# Patient Record
Sex: MALE | Race: WHITE | Employment: FULL TIME | URBAN - METROPOLITAN AREA
[De-identification: names, ages, dates, MRNs, and addresses within clinical notes are randomized per-mention and may not be internally consistent; named-entity substitution may affect disease eponyms.]

---

## 2023-06-06 ENCOUNTER — OFFICE VISIT (OUTPATIENT)
Age: 28
End: 2023-06-06
Payer: COMMERCIAL

## 2023-06-06 VITALS
RESPIRATION RATE: 20 BRPM | HEIGHT: 74 IN | TEMPERATURE: 97.8 F | OXYGEN SATURATION: 97 % | SYSTOLIC BLOOD PRESSURE: 111 MMHG | WEIGHT: 212.8 LBS | DIASTOLIC BLOOD PRESSURE: 80 MMHG | BODY MASS INDEX: 27.31 KG/M2 | HEART RATE: 62 BPM

## 2023-06-06 DIAGNOSIS — Z00.00 ROUTINE GENERAL MEDICAL EXAMINATION AT A HEALTH CARE FACILITY: Primary | ICD-10-CM

## 2023-06-06 DIAGNOSIS — R56.9 SEIZURE (HCC): ICD-10-CM

## 2023-06-06 DIAGNOSIS — Z11.4 ENCOUNTER FOR SCREENING FOR HIV: ICD-10-CM

## 2023-06-06 DIAGNOSIS — Z11.59 ENCOUNTER FOR HEPATITIS C SCREENING TEST FOR LOW RISK PATIENT: ICD-10-CM

## 2023-06-06 DIAGNOSIS — B35.4 TINEA CORPORIS: ICD-10-CM

## 2023-06-06 DIAGNOSIS — Z23 NEED FOR TETANUS, DIPHTHERIA, AND ACELLULAR PERTUSSIS (TDAP) VACCINE: ICD-10-CM

## 2023-06-06 PROCEDURE — 99395 PREV VISIT EST AGE 18-39: CPT | Performed by: STUDENT IN AN ORGANIZED HEALTH CARE EDUCATION/TRAINING PROGRAM

## 2023-06-06 PROCEDURE — 90715 TDAP VACCINE 7 YRS/> IM: CPT | Performed by: STUDENT IN AN ORGANIZED HEALTH CARE EDUCATION/TRAINING PROGRAM

## 2023-06-06 PROCEDURE — 90471 IMMUNIZATION ADMIN: CPT | Performed by: STUDENT IN AN ORGANIZED HEALTH CARE EDUCATION/TRAINING PROGRAM

## 2023-06-06 SDOH — ECONOMIC STABILITY: HOUSING INSECURITY
IN THE LAST 12 MONTHS, WAS THERE A TIME WHEN YOU DID NOT HAVE A STEADY PLACE TO SLEEP OR SLEPT IN A SHELTER (INCLUDING NOW)?: NO

## 2023-06-06 SDOH — ECONOMIC STABILITY: INCOME INSECURITY: HOW HARD IS IT FOR YOU TO PAY FOR THE VERY BASICS LIKE FOOD, HOUSING, MEDICAL CARE, AND HEATING?: NOT HARD AT ALL

## 2023-06-06 SDOH — ECONOMIC STABILITY: FOOD INSECURITY: WITHIN THE PAST 12 MONTHS, THE FOOD YOU BOUGHT JUST DIDN'T LAST AND YOU DIDN'T HAVE MONEY TO GET MORE.: NEVER TRUE

## 2023-06-06 SDOH — ECONOMIC STABILITY: FOOD INSECURITY: WITHIN THE PAST 12 MONTHS, YOU WORRIED THAT YOUR FOOD WOULD RUN OUT BEFORE YOU GOT MONEY TO BUY MORE.: NEVER TRUE

## 2023-06-06 ASSESSMENT — PATIENT HEALTH QUESTIONNAIRE - PHQ9
SUM OF ALL RESPONSES TO PHQ9 QUESTIONS 1 & 2: 0
1. LITTLE INTEREST OR PLEASURE IN DOING THINGS: 0
2. FEELING DOWN, DEPRESSED OR HOPELESS: 0
SUM OF ALL RESPONSES TO PHQ QUESTIONS 1-9: 0

## 2023-06-06 NOTE — ASSESSMENT & PLAN NOTE
He is experiencing aura that is concerning for seizure based on his past episodes. Will refer to neurology to re-establish.

## 2023-06-06 NOTE — PROGRESS NOTES
Coleen Stein is a 28y.o. year old male who is a new patient to me today (06/06/23). He was previous followed by Dr Rosanna Hart in [place], last seen 1-2 years ago. Assessment & Plan:   1. Routine general medical examination at a health care facility  Reviewed diet and exercise habits - he does very well with this. Updated health maintenance, see below. Check screening labs. -     CBC with Auto Differential; Future  -     Comprehensive Metabolic Panel; Future  -     Lipid Panel; Future  2. Seizure [facility])  Assessment & Plan:  He is experiencing aura that is concerning for seizure based on his past episodes. Will refer to neurology to re-establish. Orders:  -     [place]  3. Tinea corporis  - start lotrimin BID x2 weeks to affected area  4. Need for tetanus, diphtheria, and acellular pertussis (Tdap) vaccine  -     Tdap, BOOSTRIX, (age 8 yrs+), IM  5. Encounter for hepatitis C screening test for low risk patient  -     Hepatitis C Antibody; Future  6. Encounter for screening for HIV  -     HIV 1/2 Ag/Ab, 4TH Generation,W Rflx Confirm; Future      Health Maintenance   Flu vaccine:  COVID vaccine: 10/2022 bivalent   Tetanus vaccine: will get today   Shingles vaccine:  Pneumonia vaccine:   Colon cancer screening:   PSA:  AAA screening:  Lung cancer screening:  Hep C: check today  HIV: Check today   Lipid: check today   DM: NA  Healthcare decision maker: wife  ACP:       RTC: 1 year annual     Subjective:   Pedro Luis was seen today for [facility]    Seizure in 2015. Was seen by neurologist. Was on AED for a couple years but had side effects and had tests and was able to come off medication. He is now having episodes he describes as auras with a strange smell that is familiar to him but he is unable to tell exactly what it is, dizziness, nausea. This happened about 1 mo ago and the maybe a year ago before this.  He would like to see a

## 2023-06-06 NOTE — PATIENT INSTRUCTIONS
Neurology   - Dr Moody De Dios location  - Dr Aminah Vasquez location  - Neurological [facility]     Start lotrimin (active ingredient clotrimazole) twice daily for 2 week for fungal rash on chest and face.

## 2023-06-07 LAB
ALBUMIN SERPL-MCNC: 4.7 G/DL (ref 4.1–5.2)
ALBUMIN/GLOB SERPL: 2 {RATIO} (ref 1.2–2.2)
ALP SERPL-CCNC: 66 IU/L (ref 44–121)
ALT SERPL-CCNC: 30 IU/L (ref 0–44)
AST SERPL-CCNC: 22 IU/L (ref 0–40)
BASOPHILS # BLD AUTO: 0 X10E3/UL (ref 0–0.2)
BASOPHILS NFR BLD AUTO: 1 %
BILIRUB SERPL-MCNC: 0.2 MG/DL (ref 0–1.2)
BUN SERPL-MCNC: 20 MG/DL (ref 6–20)
BUN/CREAT SERPL: 18 (ref 9–20)
CALCIUM SERPL-MCNC: 9.7 MG/DL (ref 8.7–10.2)
CHLORIDE SERPL-SCNC: 102 MMOL/L (ref 96–106)
CHOLEST SERPL-MCNC: 211 MG/DL (ref 100–199)
CO2 SERPL-SCNC: 23 MMOL/L (ref 20–29)
CREAT SERPL-MCNC: 1.13 MG/DL (ref 0.76–1.27)
EGFRCR SERPLBLD CKD-EPI 2021: 91 ML/MIN/1.73
EOSINOPHIL # BLD AUTO: 0.1 X10E3/UL (ref 0–0.4)
EOSINOPHIL NFR BLD AUTO: 3 %
ERYTHROCYTE [DISTWIDTH] IN BLOOD BY AUTOMATED COUNT: 12.3 % (ref 11.6–15.4)
GLOBULIN SER CALC-MCNC: 2.3 G/DL (ref 1.5–4.5)
GLUCOSE SERPL-MCNC: 104 MG/DL (ref 70–99)
HCT VFR BLD AUTO: 43.4 % (ref 37.5–51)
HCV IGG SERPL QL IA: NON REACTIVE
HDLC SERPL-MCNC: 48 MG/DL
HGB BLD-MCNC: 14.9 G/DL (ref 13–17.7)
HIV 1+2 AB+HIV1 P24 AG SERPL QL IA: NON REACTIVE
IMM GRANULOCYTES # BLD AUTO: 0 X10E3/UL (ref 0–0.1)
IMM GRANULOCYTES NFR BLD AUTO: 0 %
LDLC SERPL CALC-MCNC: 138 MG/DL (ref 0–99)
LYMPHOCYTES # BLD AUTO: 1.4 X10E3/UL (ref 0.7–3.1)
LYMPHOCYTES NFR BLD AUTO: 37 %
MCH RBC QN AUTO: 28.1 PG (ref 26.6–33)
MCHC RBC AUTO-ENTMCNC: 34.3 G/DL (ref 31.5–35.7)
MCV RBC AUTO: 82 FL (ref 79–97)
MONOCYTES # BLD AUTO: 0.4 X10E3/UL (ref 0.1–0.9)
MONOCYTES NFR BLD AUTO: 11 %
NEUTROPHILS # BLD AUTO: 1.9 X10E3/UL (ref 1.4–7)
NEUTROPHILS NFR BLD AUTO: 48 %
PLATELET # BLD AUTO: 206 X10E3/UL (ref 150–450)
POTASSIUM SERPL-SCNC: 4.3 MMOL/L (ref 3.5–5.2)
PROT SERPL-MCNC: 7 G/DL (ref 6–8.5)
RBC # BLD AUTO: 5.3 X10E6/UL (ref 4.14–5.8)
SODIUM SERPL-SCNC: 140 MMOL/L (ref 134–144)
TRIGL SERPL-MCNC: 138 MG/DL (ref 0–149)
VLDLC SERPL CALC-MCNC: 25 MG/DL (ref 5–40)
WBC # BLD AUTO: 3.9 X10E3/UL (ref 3.4–10.8)

## 2024-01-15 ENCOUNTER — OFFICE VISIT (OUTPATIENT)
Age: 29
End: 2024-01-15
Payer: COMMERCIAL

## 2024-01-15 VITALS
WEIGHT: 214 LBS | HEART RATE: 74 BPM | HEIGHT: 74 IN | OXYGEN SATURATION: 98 % | DIASTOLIC BLOOD PRESSURE: 62 MMHG | BODY MASS INDEX: 27.46 KG/M2 | RESPIRATION RATE: 16 BRPM | SYSTOLIC BLOOD PRESSURE: 118 MMHG

## 2024-01-15 DIAGNOSIS — G40.019 LOCALZ-RELATED IDIOP EPILEP W SZ OF LOCALZ ONSET, INTRACT, WO STATUS (HCC): Primary | ICD-10-CM

## 2024-01-15 PROCEDURE — 99204 OFFICE O/P NEW MOD 45 MIN: CPT | Performed by: PSYCHIATRY & NEUROLOGY

## 2024-01-15 RX ORDER — LEVETIRACETAM 500 MG/1
500 TABLET ORAL 2 TIMES DAILY
Qty: 60 TABLET | Refills: 3 | Status: SHIPPED | OUTPATIENT
Start: 2024-01-15

## 2024-01-15 ASSESSMENT — PATIENT HEALTH QUESTIONNAIRE - PHQ9
1. LITTLE INTEREST OR PLEASURE IN DOING THINGS: 0
SUM OF ALL RESPONSES TO PHQ9 QUESTIONS 1 & 2: 0
SUM OF ALL RESPONSES TO PHQ QUESTIONS 1-9: 0
SUM OF ALL RESPONSES TO PHQ QUESTIONS 1-9: 0
2. FEELING DOWN, DEPRESSED OR HOPELESS: 0
SUM OF ALL RESPONSES TO PHQ QUESTIONS 1-9: 0
SUM OF ALL RESPONSES TO PHQ QUESTIONS 1-9: 0

## 2024-01-15 NOTE — PROGRESS NOTES
equal, round, and reactive to light.    Extra-ocular movements are full and fluid.  Fundoscopic exam was benign, no ptosis or nystagmus.   V-XII: Hearing is grossly intact.  Facial features are symmetric, with normal sensation and strength.  The palate rises symmetrically and the tongue protrudes midline.  Sternocleidomastoids 5/5.      Motor Examination: Normal tone, bulk, and strength. 5/5 muscle strength throughout.  No cogwheel rigidity or clonus present.      Sensory exam:  Normal throughout to pinprick, temperature, and vibration sense.  Normal proprioception.      Coordination:  Finger to nose and rapid arm movement testing was normal.   No resting or intention tremor    Gait and Station:  Steady while walking on toes, heels, and with tandem walking.  Normal arm swing.  No Rhomberg or pronator drift.   No muscle wasting or fasiculations noted.      Reflexes:  DTRs 2+ throughout.  Toes downgoing.        LABS / IMAGING  MRI brain, routine and prolonged EEGs were reportedly negative    ASSESSMENT   Diagnosis Orders   1. 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  He has had only 1 witnessed generalized tonic-clonic seizure but has been having auras about once per month.  I have recommended that he should go back on an anticonvulsant medication as prescribed levetiracetam 500 mg twice daily.  Prolonged 24-hour ambulatory EEG was also ordered for diagnostic clarification  Continue periodic follow-up  Potential seizure triggers discussed including sleep deprivation, excessive stress, alcohol etc. and should be avoided      Thor Bassett MD  Diplomate, American Board of Psychiatry & Neurology (Neurology)  Diplomate, American Board of

## 2024-01-29 ENCOUNTER — HOSPITAL ENCOUNTER (OUTPATIENT)
Facility: HOSPITAL | Age: 29
Discharge: HOME | End: 2024-02-01
Payer: COMMERCIAL

## 2024-01-29 DIAGNOSIS — G40.019 LOCALZ-RELATED IDIOP EPILEP W SZ OF LOCALZ ONSET, INTRACT, WO STATUS (HCC): ICD-10-CM

## 2024-01-29 PROCEDURE — 95708 EEG WO VID EA 12-26HR UNMNTR: CPT

## 2024-01-30 PROBLEM — G40.019: Status: ACTIVE | Noted: 2024-01-30

## 2024-01-30 PROCEDURE — 95719 EEG PHYS/QHP EA INCR W/O VID: CPT | Performed by: PSYCHIATRY & NEUROLOGY

## 2024-01-31 NOTE — PROCEDURES
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  He has been having auras about once a month, which described as a feeling of nausea, strange smell like something is burning and a wendy vu type feeling.    MEDICATIONS:  Keppra.    DESCRIPTION:  This is an 18-channel prolonged ambulatory EEG performed on 2024.  The recording starts at 12:03 p.m. and continues until 09:00 a.m. on 2024.  The dominant posterior background rhythm consists of symmetric, very well-modulated medium voltage rhythms in the 9-10 Hz frequency range.  Faster frequencies and muscle tension artifact seen intermittently in the frontal areas.  Drowsiness is characterized by slowing and vertex waves.  Stage II non-REM sleep reveals symmetric sleep spindles.  Deeper stages of sleep were characterized by predominance of delta frequencies.    The patient's event diary was reviewed and no unusual seizure-like events or auras were reported.    EEG SUMMARY:  Normal study.    CLINICAL INTERPRETATION:  This was a normal prolonged ambulatory EEG.  No lateralizing or epileptiform features were noted.  No clinical seizure-like activity was reported by the patient and no electrographic seizures were seen.      MARKOS NEUMANN MD      [id number]  D:  2024 22:56  T:  2024 1:16  JOB #:  [id number]

## 2024-02-02 NOTE — RESULT ENCOUNTER NOTE
Thor Bassett MD Javed, Jihan I, MARILU  Please inform that prolonged EEG came back normal.  If he is continuing to experience seizure-like activity or auras, next step will be to monitor in the EMU.    Patient called back, verified, states \" I have not had an seizure or aura like activity, if it happens again I will call back and let you all know.\"

## 2024-05-16 DIAGNOSIS — G40.019 LOCALZ-RELATED IDIOP EPILEP W SZ OF LOCALZ ONSET, INTRACT, WO STATUS (HCC): ICD-10-CM

## 2024-05-16 RX ORDER — LEVETIRACETAM 500 MG/1
500 TABLET ORAL 2 TIMES DAILY
Qty: 180 TABLET | Refills: 1 | Status: SHIPPED | OUTPATIENT
Start: 2024-05-16

## 2024-06-12 ENCOUNTER — OFFICE VISIT (OUTPATIENT)
Age: 29
End: 2024-06-12
Payer: COMMERCIAL

## 2024-06-12 VITALS
HEART RATE: 50 BPM | SYSTOLIC BLOOD PRESSURE: 122 MMHG | BODY MASS INDEX: 27.08 KG/M2 | OXYGEN SATURATION: 97 % | WEIGHT: 211 LBS | RESPIRATION RATE: 16 BRPM | TEMPERATURE: 97 F | DIASTOLIC BLOOD PRESSURE: 76 MMHG | HEIGHT: 74 IN

## 2024-06-12 DIAGNOSIS — Z00.00 ROUTINE GENERAL MEDICAL EXAMINATION AT A HEALTH CARE FACILITY: ICD-10-CM

## 2024-06-12 DIAGNOSIS — Z00.00 ROUTINE GENERAL MEDICAL EXAMINATION AT A HEALTH CARE FACILITY: Primary | ICD-10-CM

## 2024-06-12 PROCEDURE — 99395 PREV VISIT EST AGE 18-39: CPT | Performed by: STUDENT IN AN ORGANIZED HEALTH CARE EDUCATION/TRAINING PROGRAM

## 2024-06-12 SDOH — ECONOMIC STABILITY: FOOD INSECURITY: WITHIN THE PAST 12 MONTHS, THE FOOD YOU BOUGHT JUST DIDN'T LAST AND YOU DIDN'T HAVE MONEY TO GET MORE.: NEVER TRUE

## 2024-06-12 SDOH — ECONOMIC STABILITY: FOOD INSECURITY: WITHIN THE PAST 12 MONTHS, YOU WORRIED THAT YOUR FOOD WOULD RUN OUT BEFORE YOU GOT MONEY TO BUY MORE.: NEVER TRUE

## 2024-06-12 SDOH — ECONOMIC STABILITY: INCOME INSECURITY: HOW HARD IS IT FOR YOU TO PAY FOR THE VERY BASICS LIKE FOOD, HOUSING, MEDICAL CARE, AND HEATING?: NOT HARD AT ALL

## 2024-06-12 ASSESSMENT — PATIENT HEALTH QUESTIONNAIRE - PHQ9
2. FEELING DOWN, DEPRESSED OR HOPELESS: NOT AT ALL
1. LITTLE INTEREST OR PLEASURE IN DOING THINGS: NOT AT ALL
SUM OF ALL RESPONSES TO PHQ QUESTIONS 1-9: 0
SUM OF ALL RESPONSES TO PHQ9 QUESTIONS 1 & 2: 0
SUM OF ALL RESPONSES TO PHQ QUESTIONS 1-9: 0

## 2024-06-12 NOTE — PROGRESS NOTES
Hank Monaco is a 28 y.o. year old male who presents today (06/12/24) for annual physical exam.    Assessment & Plan:   1. Routine general medical examination at a health care facility  Reviewed diet and exercise habits - he does well with this. Updated health maintenance, see below. Check screening labs.   -     CBC; Future  -     Comprehensive Metabolic Panel; Future  2. Seizure disorder  Stable, followed by neurology, on Keppra      Health Maintenance   Flu vaccine: 10/2023  COVID vaccine: 10/2023  Tetanus vaccine: 6/2023   Shingles vaccine:  Pneumonia vaccine:   Colon cancer screening:   PSA:  AAA screening:  Lung cancer screening:  Hep C: 6/2023  HIV: 6/2023  Lipid: 6/2023  DM: NA  Healthcare decision maker: wife  ACP:        RTC: 1 year annual    Subjective:   Hank was seen today for Annual Exam (Pt here today for annual physical. [facility] )    Feeling well, no new health issues.     Wife notices that he bleeds easily.    Seizure  - Neuro Dr Bassett  - Keppra   - no longer getting auras    Diet: meal prep - rice, veggie, protein, apple sauce for lunch and dinner on the week days. May eat out on the weekends. Skips breakfast. He thinks he eats too much sugar - likes desserts, chocolate candy.     Physical activity: running, pickle ball, golf, weight lifting - 4 days/week at least. Will go on 1-2 mi walk on off days    Review of Systems   All other systems reviewed and are negative.        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 Authorizing Provider   levETIRAcetam (KEPPRA) 500 MG tablet TAKE 1 TABLET BY MOUTH TWICE A DAY 5/16/24  Yes Thor Bassett MD       The following sections were reviewed & updated as appropriate: Problem List, Allergies, PMH, PSH, FH, and SH.      Objective:   /76   Pulse 50

## 2024-06-13 LAB
ALBUMIN SERPL-MCNC: 4.4 G/DL (ref 4.3–5.2)
ALBUMIN/GLOB SERPL: 2 {RATIO}
ALP SERPL-CCNC: 70 IU/L (ref 44–121)
ALT SERPL-CCNC: 25 IU/L (ref 0–44)
AST SERPL-CCNC: 20 IU/L (ref 0–40)
BILIRUB SERPL-MCNC: 0.4 MG/DL (ref 0–1.2)
BUN SERPL-MCNC: 21 MG/DL (ref 6–20)
BUN/CREAT SERPL: 19 (ref 9–20)
CALCIUM SERPL-MCNC: 8.9 MG/DL (ref 8.7–10.2)
CHLORIDE SERPL-SCNC: 105 MMOL/L (ref 96–106)
CO2 SERPL-SCNC: 24 MMOL/L (ref 20–29)
CREAT SERPL-MCNC: 1.12 MG/DL (ref 0.76–1.27)
EGFRCR SERPLBLD CKD-EPI 2021: 91 ML/MIN/1.73
ERYTHROCYTE [DISTWIDTH] IN BLOOD BY AUTOMATED COUNT: 13.3 % (ref 11.6–15.4)
GLOBULIN SER CALC-MCNC: 2.2 G/DL (ref 1.5–4.5)
GLUCOSE SERPL-MCNC: 91 MG/DL (ref 70–99)
HCT VFR BLD AUTO: 44.9 % (ref 37.5–51)
HGB BLD-MCNC: 14.2 G/DL (ref 13–17.7)
MCH RBC QN AUTO: 26.7 PG (ref 26.6–33)
MCHC RBC AUTO-ENTMCNC: 31.6 G/DL (ref 31.5–35.7)
MCV RBC AUTO: 85 FL (ref 79–97)
PLATELET # BLD AUTO: 214 X10E3/UL (ref 150–450)
POTASSIUM SERPL-SCNC: 4.8 MMOL/L (ref 3.5–5.2)
PROT SERPL-MCNC: 6.6 G/DL (ref 6–8.5)
RBC # BLD AUTO: 5.31 X10E6/UL (ref 4.14–5.8)
SODIUM SERPL-SCNC: 139 MMOL/L (ref 134–144)
WBC # BLD AUTO: 3.4 X10E3/UL (ref 3.4–10.8)

## 2024-08-27 ENCOUNTER — OFFICE VISIT (OUTPATIENT)
Age: 29
End: 2024-08-27
Payer: COMMERCIAL

## 2024-08-27 VITALS
BODY MASS INDEX: 27.46 KG/M2 | DIASTOLIC BLOOD PRESSURE: 86 MMHG | HEART RATE: 78 BPM | OXYGEN SATURATION: 98 % | HEIGHT: 74 IN | SYSTOLIC BLOOD PRESSURE: 124 MMHG | WEIGHT: 214 LBS

## 2024-08-27 DIAGNOSIS — G40.019 LOCALZ-RELATED IDIOP EPILEP W SZ OF LOCALZ ONSET, INTRACT, WO STATUS (HCC): Primary | ICD-10-CM

## 2024-08-27 PROCEDURE — 99214 OFFICE O/P EST MOD 30 MIN: CPT | Performed by: PSYCHIATRY & NEUROLOGY

## 2024-08-27 RX ORDER — ONDANSETRON 8 MG/1
TABLET, ORALLY DISINTEGRATING ORAL
COMMUNITY

## 2024-08-27 RX ORDER — LEVETIRACETAM 500 MG/1
500 TABLET ORAL 2 TIMES DAILY
Qty: 180 TABLET | Refills: 3 | Status: SHIPPED | OUTPATIENT
Start: 2024-08-27

## 2024-08-27 NOTE — PROGRESS NOTES
Chief Complaint   Patient presents with    Follow-up     Seizures       HISTORY OF PRESENT ILLNESS  Hank Monaco back for follow-up.  Overall, he is doing well.  He has not had any further auras except for an episode back in March when he came back from a long trip, did not get much sleep and had  missed his dose of Keppra.  He did notice some irritability initially with the medication but now has adjusted well to it.    RECAP  He was diagnosed with seizure disorder/epilepsy back in 2015 and [facility]   Back in 2015, he was witnessed by his parents to have generalized tonic-clonic type activity after having fallen in the bathroom.  He hit his head against the toilet seat.  It was found that he had wet the bed even before he went to the bathroom.  He does not remember the event and was combative/agitated after the event.  Workup in the hospital.  Seizure was suspected and he was started on Keppra.  EEG and MRI brain at the time was normal.  He did fine for 3 to 4 years on this medication and had a prolonged ambulatory EEG which reportedly came back normal.  He was feeling somewhat irritable on the medication and it was weaned off.  He did fine until about a year ago when he started to experience some auras which she describes as a feeling of nausea, some strange smell as if something is burning and/or a déjà vu type feeling.  Sometimes he will be tired for a few hours after this.  Has not had any witnessed generalized seizure or loss of consciousness.  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   No family history of epilepsy and he denies any problems during childhood, head injuries, infections etc.    Current Outpatient Medications   Medication Sig    levETIRAcetam (KEPPRA) 500 MG tablet Take 1 tablet by mouth 2 times daily    ondansetron (ZOFRAN-ODT) 8 MG TBDP disintegrating tablet DISSOLVE 1 TABLET UNDER TONGUE TWICE A DAY AS NEEDED

## 2024-08-27 NOTE — PROGRESS NOTES
Chief Complaint   Patient presents with    Follow-up     Seizures     Vitals:    08/27/24 0824   BP: 124/86   Pulse: 78   SpO2: 98%

## 2025-06-13 ENCOUNTER — OFFICE VISIT (OUTPATIENT)
Age: 30
End: 2025-06-13
Payer: COMMERCIAL

## 2025-06-13 VITALS
DIASTOLIC BLOOD PRESSURE: 79 MMHG | TEMPERATURE: 97.3 F | SYSTOLIC BLOOD PRESSURE: 118 MMHG | OXYGEN SATURATION: 97 % | HEART RATE: 67 BPM | RESPIRATION RATE: 18 BRPM | WEIGHT: 221.6 LBS | BODY MASS INDEX: 28.44 KG/M2 | HEIGHT: 74 IN

## 2025-06-13 DIAGNOSIS — M54.6 ACUTE MIDLINE THORACIC BACK PAIN: ICD-10-CM

## 2025-06-13 DIAGNOSIS — Z00.00 ROUTINE GENERAL MEDICAL EXAMINATION AT A HEALTH CARE FACILITY: Primary | ICD-10-CM

## 2025-06-13 PROCEDURE — 99395 PREV VISIT EST AGE 18-39: CPT | Performed by: STUDENT IN AN ORGANIZED HEALTH CARE EDUCATION/TRAINING PROGRAM

## 2025-06-13 SDOH — ECONOMIC STABILITY: FOOD INSECURITY: WITHIN THE PAST 12 MONTHS, YOU WORRIED THAT YOUR FOOD WOULD RUN OUT BEFORE YOU GOT MONEY TO BUY MORE.: NEVER TRUE

## 2025-06-13 SDOH — ECONOMIC STABILITY: FOOD INSECURITY: WITHIN THE PAST 12 MONTHS, THE FOOD YOU BOUGHT JUST DIDN'T LAST AND YOU DIDN'T HAVE MONEY TO GET MORE.: NEVER TRUE

## 2025-06-13 ASSESSMENT — PATIENT HEALTH QUESTIONNAIRE - PHQ9
SUM OF ALL RESPONSES TO PHQ QUESTIONS 1-9: 1
2. FEELING DOWN, DEPRESSED OR HOPELESS: NOT AT ALL
SUM OF ALL RESPONSES TO PHQ QUESTIONS 1-9: 1
1. LITTLE INTEREST OR PLEASURE IN DOING THINGS: SEVERAL DAYS
SUM OF ALL RESPONSES TO PHQ QUESTIONS 1-9: 1
SUM OF ALL RESPONSES TO PHQ QUESTIONS 1-9: 1

## 2025-06-13 NOTE — PROGRESS NOTES
Hank Monaco is a 29 y.o. year old male who presents today (06/13/25) for annual physical exam.    Assessment & Plan:   1. Routine general medical examination at a health care facility  Reviewed diet and exercise habits - he does well with this. We talked about avoiding excess alcohol consumption. Updated health maintenance, see below. Check screening labs.   -     CBC  -     Comprehensive Metabolic Panel  2. Acute midline thoracic back pain  Likely muscular strain. It is improving. He would like to check Xray to make sure there is normal vertebral alignment and no fracture.   -     XR THORACIC SPINE (2 VIEWS); Future    Health Maintenance   Flu vaccine:   COVID vaccine:   Tetanus vaccine: 6/2023   Shingles vaccine:  Pneumonia vaccine:   Colon cancer screening:   PSA:  AAA screening:  Lung cancer screening:  Hep C: 6/2023  HIV: 6/2023  Lipid: 6/2023   DM: NA  Healthcare decision maker: wife  ACP:      RTC: 1 yr or sooner PRN    Subjective:   Hank was seen today for Annual Exam    History of Present Illness  The patient presents for preventative care.    Wife is expecting a baby in Dec.    He reports a stable health status with no significant changes or concerns. His seizure activity has been minimal, with only a few instances of auras, which Dr. Bassett has identified as minor seizures. These episodes typically occur when he misses his morning medication. He has not experienced a full-blown seizure in a considerable period, attributing this to his diligent medication adherence. To ensure timely medication intake, he has set up two separate alarms at 9:00 AM and 9:00 PM.    Approximately 1.5 months ago, he experienced a severe back strain while playing pickleball. Initially, he perceived the pain as muscular, but it has since localized to his spine. The first few days post-injury were particularly challenging, with slow movements and difficulty getting out of bed or leaving his desk at work. He was largely

## 2025-07-29 ENCOUNTER — OFFICE VISIT (OUTPATIENT)
Age: 30
End: 2025-07-29
Payer: COMMERCIAL

## 2025-07-29 VITALS
WEIGHT: 217.6 LBS | OXYGEN SATURATION: 98 % | HEART RATE: 94 BPM | DIASTOLIC BLOOD PRESSURE: 66 MMHG | RESPIRATION RATE: 16 BRPM | HEIGHT: 74 IN | SYSTOLIC BLOOD PRESSURE: 108 MMHG | BODY MASS INDEX: 27.93 KG/M2

## 2025-07-29 DIAGNOSIS — G40.019 LOCALZ-RELATED IDIOP EPILEP W SZ OF LOCALZ ONSET, INTRACT, WO STATUS (HCC): Primary | ICD-10-CM

## 2025-07-29 DIAGNOSIS — G40.019 LOCALZ-RELATED IDIOP EPILEP W SZ OF LOCALZ ONSET, INTRACT, WO STATUS (HCC): ICD-10-CM

## 2025-07-29 PROCEDURE — 99214 OFFICE O/P EST MOD 30 MIN: CPT | Performed by: PSYCHIATRY & NEUROLOGY

## 2025-07-29 RX ORDER — LEVETIRACETAM 750 MG/1
750 TABLET ORAL 2 TIMES DAILY
Qty: 60 TABLET | Refills: 5 | Status: SHIPPED | OUTPATIENT
Start: 2025-07-29

## 2025-07-29 ASSESSMENT — PATIENT HEALTH QUESTIONNAIRE - PHQ9
SUM OF ALL RESPONSES TO PHQ QUESTIONS 1-9: 0
SUM OF ALL RESPONSES TO PHQ QUESTIONS 1-9: 0
1. LITTLE INTEREST OR PLEASURE IN DOING THINGS: NOT AT ALL
SUM OF ALL RESPONSES TO PHQ QUESTIONS 1-9: 0
SUM OF ALL RESPONSES TO PHQ QUESTIONS 1-9: 0
2. FEELING DOWN, DEPRESSED OR HOPELESS: NOT AT ALL

## 2025-07-29 NOTE — PROGRESS NOTES
Chief Complaint   Patient presents with    Follow-up     Seizures       HISTORY OF PRESENT ILLNESS  Hank Monaco back for follow-up.  Overall, he is doing well.  Last seen August 2024.  Since then, he may have had a couple of auras 1 in March and 1 earlier this month.  It was brief lasting less than 30 seconds.  He attributes it to sinus infection, not sleeping well the night before each time.  On Keppra 500 mg twice daily.  Takes his medication regularly.  Tolerating it well except for some occasional mild irritability    RECAP  He was diagnosed with seizure disorder/epilepsy back in 2015 and used to follow-up  with a neurologist in [facility].   Back in 2015, he was witnessed by his parents to have generalized tonic-clonic type activity after having fallen in the bathroom.  He hit his head against the toilet seat.  It was found that he had wet the bed even before he went to the bathroom.  He does not remember the event and was combative/agitated after the event.  Workup in the hospital.  Seizure was suspected and he was started on Keppra.  EEG and MRI brain at the time was normal.  He did fine for 3 to 4 years on this medication and had a prolonged ambulatory EEG which reportedly came back normal.  He was feeling somewhat irritable on the medication and it was weaned off.  He did fine until about a year ago when he started to experience some auras which she describes as a feeling of nausea, some strange smell as if something is burning and/or a déjà vu type feeling.  Sometimes he will be tired for a few hours after this.  Has not had any witnessed generalized seizure or loss of consciousness.  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   No family history of epilepsy and he denies any problems during childhood, head injuries, infections etc.    Current Outpatient Medications   Medication Sig    levETIRAcetam (KEPPRA) 750 MG tablet Take 1 tablet by mouth 2 times daily

## 2025-07-30 LAB
ALBUMIN SERPL-MCNC: 4.7 G/DL (ref 4.3–5.2)
ALP SERPL-CCNC: 77 IU/L (ref 44–121)
ALT SERPL-CCNC: 26 IU/L (ref 0–44)
AST SERPL-CCNC: 26 IU/L (ref 0–40)
BILIRUB SERPL-MCNC: 0.5 MG/DL (ref 0–1.2)
BUN SERPL-MCNC: 18 MG/DL (ref 6–20)
BUN/CREAT SERPL: 15 (ref 9–20)
CALCIUM SERPL-MCNC: 9.1 MG/DL (ref 8.7–10.2)
CHLORIDE SERPL-SCNC: 101 MMOL/L (ref 96–106)
CO2 SERPL-SCNC: 19 MMOL/L (ref 20–29)
CREAT SERPL-MCNC: 1.21 MG/DL (ref 0.76–1.27)
EGFRCR SERPLBLD CKD-EPI 2021: 83 ML/MIN/1.73
ERYTHROCYTE [DISTWIDTH] IN BLOOD BY AUTOMATED COUNT: 13 % (ref 11.6–15.4)
GLOBULIN SER CALC-MCNC: 2.3 G/DL (ref 1.5–4.5)
GLUCOSE SERPL-MCNC: 92 MG/DL (ref 70–99)
HCT VFR BLD AUTO: 45.1 % (ref 37.5–51)
HGB BLD-MCNC: 14.4 G/DL (ref 13–17.7)
MCH RBC QN AUTO: 27.3 PG (ref 26.6–33)
MCHC RBC AUTO-ENTMCNC: 31.9 G/DL (ref 31.5–35.7)
MCV RBC AUTO: 85 FL (ref 79–97)
PLATELET # BLD AUTO: 214 X10E3/UL (ref 150–450)
POTASSIUM SERPL-SCNC: 4.2 MMOL/L (ref 3.5–5.2)
PROT SERPL-MCNC: 7 G/DL (ref 6–8.5)
RBC # BLD AUTO: 5.28 X10E6/UL (ref 4.14–5.8)
SODIUM SERPL-SCNC: 141 MMOL/L (ref 134–144)
WBC # BLD AUTO: 4.3 X10E3/UL (ref 3.4–10.8)

## 2025-07-31 LAB — LEVETIRACETAM SERPL-MCNC: 13.1 UG/ML (ref 10–40)
